# Patient Record
Sex: FEMALE | Race: NATIVE HAWAIIAN OR OTHER PACIFIC ISLANDER | Employment: FULL TIME | URBAN - METROPOLITAN AREA
[De-identification: names, ages, dates, MRNs, and addresses within clinical notes are randomized per-mention and may not be internally consistent; named-entity substitution may affect disease eponyms.]

---

## 2020-06-30 ENCOUNTER — ANESTHESIA EVENT (OUTPATIENT)
Dept: SURGERY | Facility: CLINIC | Age: 25
End: 2020-06-30
Payer: COMMERCIAL

## 2020-06-30 ENCOUNTER — ANESTHESIA (OUTPATIENT)
Dept: SURGERY | Facility: CLINIC | Age: 25
End: 2020-06-30
Payer: COMMERCIAL

## 2020-06-30 ENCOUNTER — APPOINTMENT (OUTPATIENT)
Dept: GENERAL RADIOLOGY | Facility: CLINIC | Age: 25
End: 2020-06-30
Attending: ORTHOPAEDIC SURGERY
Payer: COMMERCIAL

## 2020-06-30 ENCOUNTER — HOSPITAL ENCOUNTER (OUTPATIENT)
Facility: CLINIC | Age: 25
Discharge: HOME | End: 2020-06-30
Attending: ORTHOPAEDIC SURGERY | Admitting: ORTHOPAEDIC SURGERY
Payer: COMMERCIAL

## 2020-06-30 VITALS
HEART RATE: 80 BPM | HEIGHT: 62 IN | OXYGEN SATURATION: 98 % | SYSTOLIC BLOOD PRESSURE: 116 MMHG | TEMPERATURE: 98.4 F | WEIGHT: 143 LBS | BODY MASS INDEX: 26.31 KG/M2 | RESPIRATION RATE: 12 BRPM | DIASTOLIC BLOOD PRESSURE: 68 MMHG

## 2020-06-30 DIAGNOSIS — S42.021A CLOSED DISPLACED FRACTURE OF SHAFT OF RIGHT CLAVICLE, INITIAL ENCOUNTER: Primary | ICD-10-CM

## 2020-06-30 LAB — HCG UR QL: NEGATIVE

## 2020-06-30 PROCEDURE — 27210794 ZZH OR GENERAL SUPPLY STERILE: Performed by: ORTHOPAEDIC SURGERY

## 2020-06-30 PROCEDURE — 25000128 H RX IP 250 OP 636: Performed by: NURSE ANESTHETIST, CERTIFIED REGISTERED

## 2020-06-30 PROCEDURE — 25800030 ZZH RX IP 258 OP 636: Performed by: NURSE ANESTHETIST, CERTIFIED REGISTERED

## 2020-06-30 PROCEDURE — 37000008 ZZH ANESTHESIA TECHNICAL FEE, 1ST 30 MIN: Performed by: ORTHOPAEDIC SURGERY

## 2020-06-30 PROCEDURE — 25000125 ZZHC RX 250: Performed by: NURSE ANESTHETIST, CERTIFIED REGISTERED

## 2020-06-30 PROCEDURE — 36000063 ZZH SURGERY LEVEL 4 EA 15 ADDTL MIN: Performed by: ORTHOPAEDIC SURGERY

## 2020-06-30 PROCEDURE — 71000027 ZZH RECOVERY PHASE 2 EACH 15 MINS: Performed by: ORTHOPAEDIC SURGERY

## 2020-06-30 PROCEDURE — 37000009 ZZH ANESTHESIA TECHNICAL FEE, EACH ADDTL 15 MIN: Performed by: ORTHOPAEDIC SURGERY

## 2020-06-30 PROCEDURE — C1713 ANCHOR/SCREW BN/BN,TIS/BN: HCPCS | Performed by: ORTHOPAEDIC SURGERY

## 2020-06-30 PROCEDURE — 81025 URINE PREGNANCY TEST: CPT | Performed by: NURSE ANESTHETIST, CERTIFIED REGISTERED

## 2020-06-30 PROCEDURE — 36000065 ZZH SURGERY LEVEL 4 W FLUORO 1ST 30 MIN: Performed by: ORTHOPAEDIC SURGERY

## 2020-06-30 PROCEDURE — 25000132 ZZH RX MED GY IP 250 OP 250 PS 637: Performed by: PHYSICIAN ASSISTANT

## 2020-06-30 PROCEDURE — 40000306 ZZH STATISTIC PRE PROC ASSESS II: Performed by: ORTHOPAEDIC SURGERY

## 2020-06-30 PROCEDURE — 25000132 ZZH RX MED GY IP 250 OP 250 PS 637: Performed by: NURSE ANESTHETIST, CERTIFIED REGISTERED

## 2020-06-30 PROCEDURE — 25000566 ZZH SEVOFLURANE, EA 15 MIN: Performed by: ORTHOPAEDIC SURGERY

## 2020-06-30 PROCEDURE — C1769 GUIDE WIRE: HCPCS | Performed by: ORTHOPAEDIC SURGERY

## 2020-06-30 PROCEDURE — 25000128 H RX IP 250 OP 636: Performed by: PHYSICIAN ASSISTANT

## 2020-06-30 PROCEDURE — 71000012 ZZH RECOVERY PHASE 1 LEVEL 1 FIRST HR: Performed by: ORTHOPAEDIC SURGERY

## 2020-06-30 PROCEDURE — 25000132 ZZH RX MED GY IP 250 OP 250 PS 637: Performed by: ORTHOPAEDIC SURGERY

## 2020-06-30 PROCEDURE — 40000277 XR SURGERY CARM FLUORO LESS THAN 5 MIN W STILLS: Mod: TC

## 2020-06-30 DEVICE — IMP SCR SYN 3.5X16MM LOCKING W/STARDRIVE SS 212.104: Type: IMPLANTABLE DEVICE | Site: CLAVICLE | Status: FUNCTIONAL

## 2020-06-30 DEVICE — IMP SCR SYN 3.5X12MM LOCKING W/STARDRIVE SS 212.102: Type: IMPLANTABLE DEVICE | Site: CLAVICLE | Status: FUNCTIONAL

## 2020-06-30 DEVICE — IMPLANTABLE DEVICE: Type: IMPLANTABLE DEVICE | Site: CLAVICLE | Status: FUNCTIONAL

## 2020-06-30 DEVICE — IMP SCR SYN CORTEX 3.5X12MM SELF TAP SS 204.812: Type: IMPLANTABLE DEVICE | Site: CLAVICLE | Status: FUNCTIONAL

## 2020-06-30 DEVICE — IMP SCR SYN CORTEX 3.5X14MM SELF TAP SS 204.814: Type: IMPLANTABLE DEVICE | Site: CLAVICLE | Status: FUNCTIONAL

## 2020-06-30 RX ORDER — GABAPENTIN 300 MG/1
300 CAPSULE ORAL
Status: COMPLETED | OUTPATIENT
Start: 2020-06-30 | End: 2020-06-30

## 2020-06-30 RX ORDER — PROPOFOL 10 MG/ML
INJECTION, EMULSION INTRAVENOUS PRN
Status: DISCONTINUED | OUTPATIENT
Start: 2020-06-30 | End: 2020-06-30

## 2020-06-30 RX ORDER — SODIUM CHLORIDE, SODIUM LACTATE, POTASSIUM CHLORIDE, CALCIUM CHLORIDE 600; 310; 30; 20 MG/100ML; MG/100ML; MG/100ML; MG/100ML
INJECTION, SOLUTION INTRAVENOUS CONTINUOUS
Status: DISCONTINUED | OUTPATIENT
Start: 2020-06-30 | End: 2020-06-30 | Stop reason: HOSPADM

## 2020-06-30 RX ORDER — ONDANSETRON 4 MG/1
4 TABLET, ORALLY DISINTEGRATING ORAL EVERY 30 MIN PRN
Status: DISCONTINUED | OUTPATIENT
Start: 2020-06-30 | End: 2020-06-30 | Stop reason: HOSPADM

## 2020-06-30 RX ORDER — DEXAMETHASONE SODIUM PHOSPHATE 10 MG/ML
INJECTION, SOLUTION INTRAMUSCULAR; INTRAVENOUS PRN
Status: DISCONTINUED | OUTPATIENT
Start: 2020-06-30 | End: 2020-06-30

## 2020-06-30 RX ORDER — ACETAMINOPHEN 325 MG/1
975 TABLET ORAL ONCE
Status: DISCONTINUED | OUTPATIENT
Start: 2020-06-30 | End: 2020-06-30 | Stop reason: HOSPADM

## 2020-06-30 RX ORDER — DEXAMETHASONE SODIUM PHOSPHATE 4 MG/ML
4 INJECTION, SOLUTION INTRA-ARTICULAR; INTRALESIONAL; INTRAMUSCULAR; INTRAVENOUS; SOFT TISSUE EVERY 10 MIN PRN
Status: DISCONTINUED | OUTPATIENT
Start: 2020-06-30 | End: 2020-06-30 | Stop reason: HOSPADM

## 2020-06-30 RX ORDER — HYDROXYZINE HYDROCHLORIDE 25 MG/1
25 TABLET, FILM COATED ORAL ONCE
Status: COMPLETED | OUTPATIENT
Start: 2020-06-30 | End: 2020-06-30

## 2020-06-30 RX ORDER — HYDROCODONE BITARTRATE AND ACETAMINOPHEN 5; 325 MG/1; MG/1
1 TABLET ORAL EVERY 6 HOURS PRN
COMMUNITY

## 2020-06-30 RX ORDER — METOCLOPRAMIDE 10 MG/1
10 TABLET ORAL EVERY 6 HOURS PRN
Status: DISCONTINUED | OUTPATIENT
Start: 2020-06-30 | End: 2020-06-30 | Stop reason: HOSPADM

## 2020-06-30 RX ORDER — HYDROMORPHONE HYDROCHLORIDE 1 MG/ML
.3-.5 INJECTION, SOLUTION INTRAMUSCULAR; INTRAVENOUS; SUBCUTANEOUS EVERY 10 MIN PRN
Status: DISCONTINUED | OUTPATIENT
Start: 2020-06-30 | End: 2020-06-30 | Stop reason: HOSPADM

## 2020-06-30 RX ORDER — IBUPROFEN 200 MG
200 TABLET ORAL
COMMUNITY

## 2020-06-30 RX ORDER — ACETAMINOPHEN 325 MG/1
975 TABLET ORAL ONCE
Status: DISCONTINUED | OUTPATIENT
Start: 2020-06-30 | End: 2020-06-30

## 2020-06-30 RX ORDER — ONDANSETRON 2 MG/ML
INJECTION INTRAMUSCULAR; INTRAVENOUS PRN
Status: DISCONTINUED | OUTPATIENT
Start: 2020-06-30 | End: 2020-06-30

## 2020-06-30 RX ORDER — HYDROXYZINE HYDROCHLORIDE 25 MG/1
25 TABLET, FILM COATED ORAL EVERY 6 HOURS PRN
Qty: 20 TABLET | Refills: 1
Start: 2020-06-30

## 2020-06-30 RX ORDER — FENTANYL CITRATE 50 UG/ML
25-50 INJECTION, SOLUTION INTRAMUSCULAR; INTRAVENOUS
Status: DISCONTINUED | OUTPATIENT
Start: 2020-06-30 | End: 2020-06-30 | Stop reason: HOSPADM

## 2020-06-30 RX ORDER — CEFAZOLIN SODIUM 2 G/100ML
2 INJECTION, SOLUTION INTRAVENOUS
Status: COMPLETED | OUTPATIENT
Start: 2020-06-30 | End: 2020-06-30

## 2020-06-30 RX ORDER — METOCLOPRAMIDE HYDROCHLORIDE 5 MG/ML
10 INJECTION INTRAMUSCULAR; INTRAVENOUS EVERY 6 HOURS PRN
Status: DISCONTINUED | OUTPATIENT
Start: 2020-06-30 | End: 2020-06-30 | Stop reason: HOSPADM

## 2020-06-30 RX ORDER — CEFAZOLIN SODIUM 1 G/50ML
1 INJECTION, SOLUTION INTRAVENOUS SEE ADMIN INSTRUCTIONS
Status: DISCONTINUED | OUTPATIENT
Start: 2020-06-30 | End: 2020-06-30 | Stop reason: HOSPADM

## 2020-06-30 RX ORDER — MEPERIDINE HYDROCHLORIDE 25 MG/ML
12.5 INJECTION INTRAMUSCULAR; INTRAVENOUS; SUBCUTANEOUS
Status: DISCONTINUED | OUTPATIENT
Start: 2020-06-30 | End: 2020-06-30 | Stop reason: HOSPADM

## 2020-06-30 RX ORDER — SCOLOPAMINE TRANSDERMAL SYSTEM 1 MG/1
1 PATCH, EXTENDED RELEASE TRANSDERMAL ONCE
Status: COMPLETED | OUTPATIENT
Start: 2020-06-30 | End: 2020-06-30

## 2020-06-30 RX ORDER — HYDRALAZINE HYDROCHLORIDE 20 MG/ML
2.5-5 INJECTION INTRAMUSCULAR; INTRAVENOUS EVERY 10 MIN PRN
Status: DISCONTINUED | OUTPATIENT
Start: 2020-06-30 | End: 2020-06-30 | Stop reason: HOSPADM

## 2020-06-30 RX ORDER — CELECOXIB 200 MG/1
200 CAPSULE ORAL ONCE
Status: COMPLETED | OUTPATIENT
Start: 2020-06-30 | End: 2020-06-30

## 2020-06-30 RX ORDER — OXYCODONE HYDROCHLORIDE 5 MG/1
5 TABLET ORAL
Status: COMPLETED | OUTPATIENT
Start: 2020-06-30 | End: 2020-06-30

## 2020-06-30 RX ORDER — ALBUTEROL SULFATE 0.83 MG/ML
2.5 SOLUTION RESPIRATORY (INHALATION) EVERY 4 HOURS PRN
Status: DISCONTINUED | OUTPATIENT
Start: 2020-06-30 | End: 2020-06-30 | Stop reason: HOSPADM

## 2020-06-30 RX ORDER — ONDANSETRON 2 MG/ML
4 INJECTION INTRAMUSCULAR; INTRAVENOUS EVERY 30 MIN PRN
Status: DISCONTINUED | OUTPATIENT
Start: 2020-06-30 | End: 2020-06-30 | Stop reason: HOSPADM

## 2020-06-30 RX ORDER — FENTANYL CITRATE 50 UG/ML
INJECTION, SOLUTION INTRAMUSCULAR; INTRAVENOUS PRN
Status: DISCONTINUED | OUTPATIENT
Start: 2020-06-30 | End: 2020-06-30

## 2020-06-30 RX ORDER — ACETAMINOPHEN 325 MG/1
975 TABLET ORAL ONCE
Status: COMPLETED | OUTPATIENT
Start: 2020-06-30 | End: 2020-06-30

## 2020-06-30 RX ORDER — CELECOXIB 200 MG/1
200 CAPSULE ORAL
Status: DISCONTINUED | OUTPATIENT
Start: 2020-06-30 | End: 2020-06-30 | Stop reason: HOSPADM

## 2020-06-30 RX ORDER — NALOXONE HYDROCHLORIDE 0.4 MG/ML
.1-.4 INJECTION, SOLUTION INTRAMUSCULAR; INTRAVENOUS; SUBCUTANEOUS
Status: DISCONTINUED | OUTPATIENT
Start: 2020-06-30 | End: 2020-06-30 | Stop reason: HOSPADM

## 2020-06-30 RX ORDER — LIDOCAINE 40 MG/G
CREAM TOPICAL
Status: DISCONTINUED | OUTPATIENT
Start: 2020-06-30 | End: 2020-06-30 | Stop reason: HOSPADM

## 2020-06-30 RX ORDER — OXYCODONE HYDROCHLORIDE 5 MG/1
5-10 TABLET ORAL EVERY 4 HOURS PRN
Qty: 25 TABLET | Refills: 0
Start: 2020-06-30

## 2020-06-30 RX ADMIN — FENTANYL CITRATE 100 MCG: 50 INJECTION, SOLUTION INTRAMUSCULAR; INTRAVENOUS at 16:02

## 2020-06-30 RX ADMIN — ONDANSETRON 4 MG: 2 INJECTION INTRAMUSCULAR; INTRAVENOUS at 18:59

## 2020-06-30 RX ADMIN — FENTANYL CITRATE 100 MCG: 50 INJECTION, SOLUTION INTRAMUSCULAR; INTRAVENOUS at 16:44

## 2020-06-30 RX ADMIN — FENTANYL CITRATE 150 MCG: 50 INJECTION, SOLUTION INTRAMUSCULAR; INTRAVENOUS at 16:10

## 2020-06-30 RX ADMIN — ACETAMINOPHEN 975 MG: 325 TABLET, FILM COATED ORAL at 15:16

## 2020-06-30 RX ADMIN — SUGAMMADEX 200 MG: 100 INJECTION, SOLUTION INTRAVENOUS at 17:15

## 2020-06-30 RX ADMIN — ONDANSETRON 4 MG: 2 INJECTION INTRAMUSCULAR; INTRAVENOUS at 16:10

## 2020-06-30 RX ADMIN — LIDOCAINE HYDROCHLORIDE 0.2 ML: 10 INJECTION, SOLUTION EPIDURAL; INFILTRATION; INTRACAUDAL; PERINEURAL at 15:33

## 2020-06-30 RX ADMIN — OXYCODONE HYDROCHLORIDE 5 MG: 5 TABLET ORAL at 18:37

## 2020-06-30 RX ADMIN — CEFAZOLIN SODIUM 2 G: 2 INJECTION, SOLUTION INTRAVENOUS at 16:31

## 2020-06-30 RX ADMIN — PROPOFOL 200 MG: 10 INJECTION, EMULSION INTRAVENOUS at 16:10

## 2020-06-30 RX ADMIN — MIDAZOLAM 2 MG: 1 INJECTION INTRAMUSCULAR; INTRAVENOUS at 15:59

## 2020-06-30 RX ADMIN — SODIUM CHLORIDE, POTASSIUM CHLORIDE, SODIUM LACTATE AND CALCIUM CHLORIDE: 600; 310; 30; 20 INJECTION, SOLUTION INTRAVENOUS at 16:45

## 2020-06-30 RX ADMIN — HYDROMORPHONE HYDROCHLORIDE 1 MG: 1 INJECTION, SOLUTION INTRAMUSCULAR; INTRAVENOUS; SUBCUTANEOUS at 17:36

## 2020-06-30 RX ADMIN — GABAPENTIN 300 MG: 300 CAPSULE ORAL at 15:16

## 2020-06-30 RX ADMIN — SCOPALAMINE 1 PATCH: 1 PATCH, EXTENDED RELEASE TRANSDERMAL at 16:15

## 2020-06-30 RX ADMIN — FENTANYL CITRATE 100 MCG: 50 INJECTION, SOLUTION INTRAMUSCULAR; INTRAVENOUS at 17:23

## 2020-06-30 RX ADMIN — HYDROXYZINE HYDROCHLORIDE 25 MG: 25 TABLET ORAL at 18:56

## 2020-06-30 RX ADMIN — DEXAMETHASONE SODIUM PHOSPHATE 10 MG: 10 INJECTION, SOLUTION INTRAMUSCULAR; INTRAVENOUS at 16:10

## 2020-06-30 RX ADMIN — SODIUM CHLORIDE, POTASSIUM CHLORIDE, SODIUM LACTATE AND CALCIUM CHLORIDE: 600; 310; 30; 20 INJECTION, SOLUTION INTRAVENOUS at 15:33

## 2020-06-30 RX ADMIN — ROCURONIUM BROMIDE 50 MG: 10 INJECTION INTRAVENOUS at 16:10

## 2020-06-30 RX ADMIN — CELECOXIB 200 MG: 200 CAPSULE ORAL at 15:16

## 2020-06-30 ASSESSMENT — MIFFLIN-ST. JEOR: SCORE: 1352.92

## 2020-06-30 NOTE — ANESTHESIA PREPROCEDURE EVALUATION
"Anesthesia Pre-Procedure Evaluation    Patient: Maeve Balderas   MRN: [id number] : 1995          Preoperative Diagnosis: Clavicle fracture [S42.009A]    Procedure(s):  OPEN REDUCTION INTERNAL FIXATION, FRACTURE, CLAVICLE    History reviewed. No pertinent past medical history.  History reviewed. No pertinent surgical history.    Anesthesia Evaluation     . Pt has had prior anesthetic. Type: General    No history of anesthetic complications          ROS/MED HX    ENT/Pulmonary:  - neg pulmonary ROS     Neurologic:  - neg neurologic ROS     Cardiovascular:  - neg cardiovascular ROS       METS/Exercise Tolerance:     Hematologic:         Musculoskeletal:         GI/Hepatic:         Renal/Genitourinary:         Endo:         Psychiatric:         Infectious Disease:         Malignancy:         Other:                          Physical Exam  Normal systems: cardiovascular and dental    Airway   Mallampati: I  TM distance: >3 FB  Neck ROM: full    Dental     Cardiovascular   Rhythm and rate: regular and normal      Pulmonary    breath sounds clear to auscultation            Lab Results   Component Value Date    HCG Negative 2020       Preop Vitals  BP Readings from Last 3 Encounters:   20 124/66    Pulse Readings from Last 3 Encounters:   20 89      Resp Readings from Last 3 Encounters:   20 9    SpO2 Readings from Last 3 Encounters:   20 97%      Temp Readings from Last 1 Encounters:   20 37.2  C (99  F) (Oral)    Ht Readings from Last 1 Encounters:   20 1.568 m (5' 1.75\")      Wt Readings from Last 1 Encounters:   20 64.9 kg (143 lb)    Estimated body mass index is 26.37 kg/m  as calculated from the following:    Height as of this encounter: 1.568 m (5' 1.75\").    Weight as of this encounter: 64.9 kg (143 lb).       Anesthesia Plan      History & Physical Review  History and physical reviewed and following examination; no interval change.    ASA Status:  1 .    NPO " Status:  > 6 hours    Plan for General with Intravenous induction. Maintenance will be Balanced.    PONV prophylaxis:  Ondansetron (or other 5HT-3), Dexamethasone or Solumedrol and Scopolamine patch         Postoperative Care  Postoperative pain management:  IV analgesics and Oral pain medications.      Consents  Anesthetic plan, risks, benefits and alternatives discussed with:  Patient..                 HUNTER Romero CRNA

## 2020-06-30 NOTE — OP NOTE
Preoperative diagnosis: Right clavicle fracture    Postoperative diagnosis: Right clavicle fracture    Procedure: Open reduction internal fixation right clavicle fracture    Anesthesia: General    Surgeon: Odilon Iglesias MD    Assistant: Joanna Larsen PA-C    Procedure: The patient was taken to the main operating room suite.  Once there she was transferred over to the operating room table in the supine position.  She was induced per anesthesia.  She was transferred up to the beachchair position and all pressure points including both ulnar nerves were carefully protected and padded throughout the course of the case.  The right upper extremity was prepped and draped in usual sterile fashion.  A 10 blade knife was utilized to make an incision for an anterior superior approach of the clavicle.  Sharp dissection was carried down through the fascia.  The medial fragment had buttonholed through the fascia and split the fascia already.  We extended the split and expose both the medial and the lateral fragments.  There was approximately 2-1/2 cm of shortening and 2-1/2 cm of displacement with soft tissue interposed between the fragments.  The fragments were reduced and provisionally pinned with a K wire.  A Synthes 6-hole plate was selected and a bicortical compression screw was put in place medially and laterally.  This was followed by 2 locking screws medially.  A locking screw was put in place most laterally.  Another compression screw was put in place laterally as well.  In this fashion 6 cortices of purchase was achieved medially and laterally with excellent stability and excellent purchase.  The wound was irrigated and the fascia was closed with 0 Vicryl in a running fashion.  Subcutaneous tissue was closed with 2-0 undyed Vicryl in a running simple suture fashion.  Skin was closed with 3-0 V-Lock in a running 6 subcuticular fashion covered by Dermabond.  Half percent Marcaine with epinephrine was used for  postoperative analgesia.  A soft dressing was applied covered by tape.  The patient's arm was placed in a sling.  She was awakened extubated per anesthesia and transferred to the transfer cart taken recovery room in stable addition being spontaneously.

## 2020-06-30 NOTE — ANESTHESIA POSTPROCEDURE EVALUATION
Patient: Maeve Balderas    Procedure(s):  OPEN REDUCTION INTERNAL FIXATION, FRACTURE, CLAVICLE    Diagnosis:Clavicle fracture [S42.009A]  Diagnosis Additional Information: No value filed.    Anesthesia Type:  No value filed.    Note:  Anesthesia Post Evaluation    Patient location during evaluation: Bedside  Patient participation: Able to fully participate in evaluation  Level of consciousness: awake and alert  Pain management: adequate  multimodal analgesia used between 6 hours prior to anesthesia start to PACU dischargeAirway patency: patent  Cardiovascular status: acceptable  Respiratory status: acceptable  two or more mitigation strategies used for obstructive sleep apneaHydration status: acceptable  PONV: none     Anesthetic complications: None          Last vitals:  Vitals:    06/30/20 1500 06/30/20 1752 06/30/20 1800   BP: 107/72 133/59 124/66   Pulse:  99 89   Resp: 18 16 9   Temp: 37.1  C (98.7  F) 37.2  C (99  F)    SpO2: 99% 96% 97%         Electronically Signed By: HUNTER Romero CRNA  June 30, 2020  6:18 PM

## 2020-07-01 NOTE — OR NURSING
Patient feeling nauseated. Lights dimmed, ondansetron given. Pt resting, will continue to monitor.    Kacey Wang RN on 6/30/2020 at 7:06 PM

## 2020-07-01 NOTE — DISCHARGE INSTRUCTIONS
Same Day Surgery Discharge Instructions  Special Precautions After Surgery - Adult    1. It is not unusual to feel lightheaded or faint, up to 24 hours after surgery or while taking pain medication.  If you have these symptoms; sit for a few minutes before standing and have someone assist you when getting up.  2. You should rest and relax for the next 24 hours and must have someone stay with you for at least 24 hours after your discharge.  3. DO NOT DRIVE any vehicle or operate mechanical equipment for 24 hours following the end of your surgery.  DO NOT DRIVE while taking narcotic pain medications that have been prescribed by your physician.  If you had a limb operated on, you must be able to use it fully to drive.  4. DO NOT drink alcoholic beverages for 24 hours following surgery or while taking prescription pain medication.  5. Drink clear liquids (apple juice, ginger ale, broth, 7-Up, etc.).  Progress to your regular diet as you feel able.  6. Any questions call your physician and do not make important decisions for 24 hours.    ACTIVITY  ? Rest today.  No activity or diet restrictions.  ? See printed discharge sheet.     INCISIONAL CARE  ? Do not remove dressing until seen by physician.  ? Be alert for signs of infection:  redness, swelling, heat, drainage of pus, and/or elevated temperature.  Contact your doctor if these occur.        Medications:  ? Acetaminophen (Tylenol):  Next dose: 9:00 p.m.  ? Ibuprofen (Motrin, Advil):  Next dose: 10:00 p.m.  ? Hydroxyzine: Next dose: 1:00 a.m. to increase effectiveness of pain medications  ? Oxycodone: No sooner than 10:30 p.m. for pain not controlled with tylenol or ibuprofen  ? Follow the instructions on the bottle.     Additional discharge instructions:     Discharge instructions for Patient with Scopolamine Transdermal Patch    1.  You may leave the patch on behind your ear for three days-But NO LONGER.  May have withdrawal symptoms (nausea,  vomiting, headache,dizziness) if used longer.  2.  When you remove the patch, you must wash and dry your hands thoroughly and before touching your eyes, as pupils may dilate.  3.  Discard patch (away from children and pets).  4.  May develop some urinary hesitancy or urine retention.  5.  Patch should be removed by:  07/03/2020 at 1615    You have received a medication during surgery called suggamadex and must use condoms or another back up birth control method for 7 days to prevent pregnancy. Your birth control pills are considered ineffective at this time.    Nausea and Vomiting  What are nausea and vomiting?   Nausea is the queasy feeling you usually have before you vomit. Vomiting is the forceful emptying (throwing up) of the stomach's contents through the mouth.   What causes nausea and vomiting?   Nausea and vomiting are symptoms that may occur with many conditions, such as:   Anesthesia medications   side effect of narcotic medicines  exposure to unpleasant odors or sights   stress and anxiety     How is it treated?   At first you should rest your stomach for a few hours by eating nothing solid and sipping only clear liquids. A little later you can eat soft bland foods that are easy to digest.   It is important to drink small amounts (1 to 4 ounces) often so that you do not become dehydrated. Gradually drink larger amounts of the clear fluids. If you vomit, wait an hour, then start over with a smaller amount of fluid.   Eat slowly and avoid foods that are acidic, spicy, fatty, or fibrous (such as meats, coarse grains, and raw vegetables). Also avoid extremely hot or cold food. In addition, avoid dairy products if you have diarrhea. You may start eating your normal diet again in 3 days or so, when all signs of illness have passed.   Rest as much as possible. Sit or lie down with your head propped up. Do not lie flat for at least 2 hours after eating. Nausea and vomiting usually last only a short period of  time. If you have cramping or pain in your belly you can try putting a heating pad set at low or a covered hot water bottle on your belly. Never set a heating pad on high because you could get burned.   If you have been vomiting for more than a day or have had diarrhea for over 3 days, you may need to have an exam by your provider, including a check for dehydration. If you are very dehydrated, you may need to be given fluids intravenously (IV). In children and older adults dehydration can quickly become life threatening.   When should I call my healthcare provider?   Talk with your provider if you are unable to keep fluids down for more than 12 hours or if you have any of the following symptoms with nausea and vomiting:   severe headache or neck ache, or stiff neck   severe abdominal pain   diarrhea and vomiting that last more than 24 hours   blood in the vomited material that may look red, brown, or black, or like coffee grounds   bloody diarrhea   very forceful vomiting   signs of dehydration such as dry mouth, excessive thirst, little or no urination, severe weakness, dizziness, or lightheadedness.   If you have nausea and pain in the jaw, arm, shoulder, chest, or back; sweating; shortness of breath; or lightheadedness; call 911 for emergency care.       Post-operative Infection  What is a wound infection?    watch for signs of infection. Signs that the wound/incision is infected include:   Pus or cloudy fluid draining from the incision.   A pimple or yellow crust forming on the incision   The scab is increasing in size.   Increasing redness occurs around the incisions  A red streak is spreading from the wound toward the heart.   The incision has become extremely tender and/or hot   The lymph node draining that area of skin may become large and tender.   You may develop a fever over 100?F (37.8?C).     What is the cause?   Most skin infections follow breaks in the skin (for example, surgery,  from cuts, puncture  wounds, animal bites, splinters, thorns, or burns). Bacteria (especially staphylococcus or streptococcus) then invade the wound and cause the infection.    Deeper wounds (like surgical incisions) are much more likely to become infected than superficial wounds (for example, scrapes).     What is the treatment?   Call your doctor's clinic if you feel you have the beginnings of an infection   Antibiotics You will probably need antibiotics prescribed by your healthcare provider. This medicine will kill the germs that are causing the wound infection. Try not to forget any of the doses.  Even if you feel better in a few days, take the antibiotic until it is completely gone to keep the infection from flaring up again.    Fever and pain relief Take acetaminophen or ibuprofen if you develop a fever over 102?F (39?C)    How can I help prevent infections?   Wash around all new incisions vigorously with soap and water for 5 to 10 minutes to remove dirt and bacteria.      When should I call my healthcare provider?   Call IMMEDIATELY if:   The redness keeps spreading.   The wound becomes extremely painful.   Call during office hours if:   The fever is not gone 48 hours after you start taking an antibiotic.   The wound infection does not look better 3 days after your start taking an antibiotic.   The wound isn't completely healed within 10 days.   You have other questions or concerns.     Breakthrough Bleeding    How/why does it occur?   There are many causes of breakthrough bleeding onto your dressing. The two most common causes are increased activity and increased NSAID use.     How is it treated?   The treatment for breakthrough dressing bleeding depends on the cause. For simple problems such as a saturated dressing, you may need to reinforce the dressing with more gauze and tape and put slight pressure on the site.  Call your healthcare provider if something else is causing  bleeding.  __________________________________________________________________________________________________________________________________  IMPORTANT NUMBERS:    [facility] [place]:  [phone], [phone]  Pharmacy:  [phone]  Same Day Surgery:  [phone], Monday - Friday until 8:30 p.m.  Urgent Care:  [phone]  Emergency Room:  [phone]      [facility]:  [phone]                                                                             [facility]:  [phone]     [facility]:  [phone]

## (undated) DEVICE — SU VICRYL 0 CT-1 36" J946H

## (undated) DEVICE — GOWN LG DISP 9515

## (undated) DEVICE — PREP SKIN SCRUB TRAY 4461A

## (undated) DEVICE — SU STRATAFIX MONOCRYL 3-0 SPIRAL PS-2 30CM SXMP1B106

## (undated) DEVICE — PACK SHOULDER

## (undated) DEVICE — SPONGE LAP 18X18" 1515

## (undated) DEVICE — SLING ARM MED 0814-0063

## (undated) DEVICE — DRAPE C-ARM PACK 9"

## (undated) DEVICE — SOL WATER IRRIG 1000ML BOTTLE 07139-09

## (undated) DEVICE — PREP CHLORHEXIDINE 4% 4OZ (HIBICLENS) 57504

## (undated) DEVICE — GLOVE PROTEXIS BLUE W/NEU-THERA 7.0  2D73EB70

## (undated) DEVICE — DRAPE MAYO STAND 23X54 8337

## (undated) DEVICE — DECANTER VIAL 2006S

## (undated) DEVICE — DRAPE SHOULDER PACK SPLITS 29365

## (undated) DEVICE — GLOVE PROTEXIS W/NEU-THERA 7.0  2D73TE70

## (undated) DEVICE — GUIDE WIRE 1.25X150MM 4.0MM THRD 900.722

## (undated) DEVICE — DRILL BIT QUICK COUPLING 2.5X110MM GOLD 310.25

## (undated) DEVICE — CLOSURE SYS SKIN PREMIERPRO EXOFIN FUSION 4X22CM STRL 3472

## (undated) DEVICE — SU VICRYL 2-0 SH 27" UND J417H

## (undated) DEVICE — GLOVE PROTEXIS W/NEU-THERA 7.5  2D73TE75

## (undated) DEVICE — SUCTION TIP POOLE K770

## (undated) DEVICE — DRILL BIT SYN QUICK COUPLING 2.8X165MM 310.288

## (undated) RX ORDER — GABAPENTIN 300 MG/1
CAPSULE ORAL
Status: DISPENSED
Start: 2020-06-30

## (undated) RX ORDER — FENTANYL CITRATE 50 UG/ML
INJECTION, SOLUTION INTRAMUSCULAR; INTRAVENOUS
Status: DISPENSED
Start: 2020-06-30

## (undated) RX ORDER — CELECOXIB 200 MG/1
CAPSULE ORAL
Status: DISPENSED
Start: 2020-06-30

## (undated) RX ORDER — ONDANSETRON 2 MG/ML
INJECTION INTRAMUSCULAR; INTRAVENOUS
Status: DISPENSED
Start: 2020-06-30

## (undated) RX ORDER — CEFAZOLIN SODIUM 2 G/100ML
INJECTION, SOLUTION INTRAVENOUS
Status: DISPENSED
Start: 2020-06-30

## (undated) RX ORDER — ACETAMINOPHEN 325 MG/1
TABLET ORAL
Status: DISPENSED
Start: 2020-06-30

## (undated) RX ORDER — DEXAMETHASONE SODIUM PHOSPHATE 10 MG/ML
INJECTION, SOLUTION INTRAMUSCULAR; INTRAVENOUS
Status: DISPENSED
Start: 2020-06-30

## (undated) RX ORDER — PROPOFOL 10 MG/ML
INJECTION, EMULSION INTRAVENOUS
Status: DISPENSED
Start: 2020-06-30

## (undated) RX ORDER — HYDROXYZINE HYDROCHLORIDE 25 MG/1
TABLET, FILM COATED ORAL
Status: DISPENSED
Start: 2020-06-30

## (undated) RX ORDER — OXYCODONE HYDROCHLORIDE 5 MG/1
TABLET ORAL
Status: DISPENSED
Start: 2020-06-30

## (undated) RX ORDER — BUPIVACAINE HYDROCHLORIDE AND EPINEPHRINE 5; 5 MG/ML; UG/ML
INJECTION, SOLUTION EPIDURAL; INTRACAUDAL; PERINEURAL
Status: DISPENSED
Start: 2020-06-30